# Patient Record
Sex: FEMALE | Race: WHITE | HISPANIC OR LATINO | Employment: OTHER | URBAN - METROPOLITAN AREA
[De-identification: names, ages, dates, MRNs, and addresses within clinical notes are randomized per-mention and may not be internally consistent; named-entity substitution may affect disease eponyms.]

---

## 2017-08-09 ENCOUNTER — HOSPITAL ENCOUNTER (OUTPATIENT)
Dept: RADIOLOGY | Facility: HOSPITAL | Age: 67
Discharge: HOME | End: 2017-08-09
Attending: SURGERY
Payer: COMMERCIAL

## 2017-08-09 DIAGNOSIS — N63.0 LUMP OR MASS IN BREAST: ICD-10-CM

## 2017-08-09 DIAGNOSIS — R92.1 BREAST CALCIFICATIONS: Primary | ICD-10-CM

## 2017-08-09 PROCEDURE — 88305 TISSUE EXAM BY PATHOLOGIST: CPT | Mod: 26,,, | Performed by: PATHOLOGY

## 2017-08-09 PROCEDURE — 19081 BX BREAST 1ST LESION STRTCTC: CPT | Mod: ,,, | Performed by: RADIOLOGY

## 2017-08-09 PROCEDURE — 88305 TISSUE EXAM BY PATHOLOGIST: CPT | Performed by: PATHOLOGY

## 2017-08-09 PROCEDURE — 77065 DX MAMMO INCL CAD UNI: CPT | Mod: TC,LT

## 2017-08-09 PROCEDURE — 77065 DX MAMMO INCL CAD UNI: CPT | Mod: 26,LT,, | Performed by: RADIOLOGY

## 2017-08-09 PROCEDURE — A4648 IMPLANTABLE TISSUE MARKER: HCPCS

## 2017-08-09 NOTE — OP NOTE
[facility]  Radiology  Procedure - Outpatient    Date: 08/09/2017 Time: 11:17 AM    Pre-Op Diagnosis: Abnormal mammogram, suspicious calcifications left breast    Post-Op Diagnosis: same    Procedure Performed by: Fabrizio Snow MD    Assistant: none    Procedure: Stereotactic left breast biopsy    Specimen/Tissue Removed: 6 x 11 gauge cores    Estimated Blood Loss: Less than 5 mL    Procedure Note/Findings: Stereotactic left breast biopsy performed with no immediate post-procedure complications noted.    Please refer to dictated report for additional details.

## 2017-08-09 NOTE — H&P
[facility] [place]  History & Physical - Short Stay  Interventional Radiology    SUBJECTIVE:     Chief Complaint/Reason for Admission: Abnormal mammogram, suspicious calcifications left breast    Informant(s):  self and Electronic Health Record    History of Present Illness:  Richa Alberts is a 66 y.o. female with a history of abnormal mammogram.    Patient presents for stereotactic breast biopsy on left.    Scheduled Meds:   Continuous Infusions:   PRN Meds:     Review of patient's allergies indicates:   Allergen Reactions    Benadryl [diphenhydramine hcl]     Penicillins     Sulfa (sulfonamide antibiotics)        No past medical history on file.  Past Surgical History:   Procedure Laterality Date    CHOLECYSTECTOMY      TUBAL LIGATION      vaginal excisional biopsy       Family History   Problem Relation Age of Onset    Hypertension Father     Diabetes Mother     Hypertension Mother     Diabetes Sister     Stroke Sister      Social History   Substance Use Topics    Smoking status: Never Smoker    Smokeless tobacco: Not on file    Alcohol use No        Review of Systems:  ROS not obtained    OBJECTIVE:     Vital Signs (Most Recent):       Physical Exam:  alert and oriented    Laboratory  CBC: No results found for: WBC, RBC, HGB, HCT, PLT, MCV, MCH, MCHC    ASSESSMENT/PLAN:     Suspicious calcifications left breast.    Patient will undergo stereotactic breast biopsy.    Sedation Plan: 1% lidocaine local anesthetic

## 2017-08-09 NOTE — DISCHARGE SUMMARY
Radiology Discharge Summary      Admit date: 8/9/2017 10:00 AM  Discharge date: August 9, 2017    Instructions Given to patient: YesVerbal    Diet: Regular    Activity:Restriction as listed: No strenuous activities for 48 hours    Medications on discharge (List): Refer to Discharge Medication List    Hospital Course: Stereotactic breast biopsy left breast.    Description of Condition on Discharge: stable    Discharge Disposition: Home    Discharge Diagnosis: Suspicious calcifications on mammography.    Follow up as scheduled.

## 2018-05-22 ENCOUNTER — OFFICE VISIT (OUTPATIENT)
Dept: PODIATRY | Facility: CLINIC | Age: 68
End: 2018-05-22
Payer: COMMERCIAL

## 2018-05-22 VITALS
DIASTOLIC BLOOD PRESSURE: 92 MMHG | WEIGHT: 159 LBS | HEIGHT: 63 IN | SYSTOLIC BLOOD PRESSURE: 152 MMHG | BODY MASS INDEX: 28.17 KG/M2

## 2018-05-22 PROCEDURE — 99999 PR PBB SHADOW E&M-EST. PATIENT-LVL III: CPT | Mod: PBBFAC,,, | Performed by: PODIATRIST

## 2018-05-22 PROCEDURE — 99203 OFFICE O/P NEW LOW 30 MIN: CPT | Mod: S$GLB,,, | Performed by: PODIATRIST

## 2018-05-22 PROCEDURE — 3008F BODY MASS INDEX DOCD: CPT | Mod: CPTII,S$GLB,, | Performed by: PODIATRIST

## 2018-05-22 RX ORDER — TRAMADOL HYDROCHLORIDE 50 MG/1
TABLET ORAL
COMMUNITY

## 2018-05-22 RX ORDER — ACYCLOVIR 800 MG/1
TABLET ORAL
COMMUNITY

## 2018-05-22 RX ORDER — LOSARTAN POTASSIUM 25 MG/1
TABLET ORAL
COMMUNITY

## 2018-05-22 NOTE — PATIENT INSTRUCTIONS
Athletes Foot     Athletes Foot is caused by a fungal infection in the skin. It affects the skin between the toes where it causes fissures (cracks in the skin). It can also affect the bottom of the foot where it causes dry white scales and peeling of the skin. This infection is more likely to occur when the foot is in hot, sweaty socks and shoes for long periods of time.   This infection is treated with skin creams or oral medicine.     Home Care:   It is important to keep the feet dry. Use absorbent cotton socks and change them if they become sweaty; or wear an open-toe shoe or sandal. Wash the feet at least once a day with soap and water.   Rotate your shoes. If you must wear the same shoes everyday then spray the shoes with lysol or antifungal spray and allow that to dry overnight before wearing the shoes again  Apply the antifungal cream as prescribed. Some antifungal creams are available without a prescription (Lotrimin, Tinactin).   It may take a week before the rash starts to improve and it can take about three to four weeks to completely clear. Continue the medicine until the rash is all gone.   Use over-the-counter antifungal powders or sprays on your feet after exposure to high-risk environments (public showers, gyms and locker rooms) may prevent future infections. You may wish to use appropriate footwear to reduce exposure.  Clean tubs and bathroom floor with bleach  Clean feet with Nizoral shampoo or dial antibacterial soap and then dry completely.    Follow Up   with your doctor as recommended by our staff if the rash is not starting to improve after TEN days of treatment, or if the rash continues to spread.     Get Prompt Medical Attention   if any of the following occur:   Increasing redness or swelling of the foot   Pus draining from cracks in the skin   Fever of 100.4ºF (38ºC) or higher, or as directed by your healthcare provider    © 2961-1960 [employer], [address] [place] [address],  [place] [address]. All rights reserved. This information is not intended as a substitute for professional medical care. Always follow your healthcare professional's instructions.             Recommend lotions: eucerin, eucerin for diabetics, aquaphor, A&D ointment, gold bond for diabetics, sween, Stephon's Bees all purpose baby ointment,  urea 40 with aloe (found on amazon.com)    Shoe recommendations: (try 6pm.com, zappos.Veritext , nordstromrack.Veritext, or shoes.Veritext for discounted prices) you can visit [employer]SW shoes in [place]  or [employer] in the [place] (there are also several shoe brand outlets in the [place])    Asics (GT 2000 or gel foundations), new balance stability type shoes, saucony (stabil c3),  Corrales (GTS or Beast or transcend), vionic, propet (tennis shoe)    sofft brand, clarks, crocs, aerosoles, naturalizers, SAS, ecco, born, shari otero, rockports (dress shoes)    Vionic, burkenstocks, fitflops, propet (sandals)  Nike comfort thong sandals, crocs, propet (house shoes)    Nail Home remedy:  Vicks Vapor rub to nails for easier managability    Occasional soaks for 15-20 mins in luke warm water with 1 cup of listerine and 1 cup of apple cider vinegar are ok You may add several drops of oil of oregano or tea tree oil as well      Wearing Proper Shoes                    You walk on your feet every day, forcing them to support the weight of your body. Repeated stress on your feet can cause damage over time. The right shoes can help protect your feet. The wrong shoes can cause more foot problems. Read the information below to help you find a shoe that fits your foot needs.      A good shoe fit will cover your foot outline. A shoe that doesnt cover the outline is a bad fit.   Whats your foot shape?  To get a good fit, you need to know the shape of your foot. Do this simple test: While standing, place your foot on a piece of paper and trace around it. Is your foot straight or curved? Do you have a foot problem, such as a  bunion, that causes your foot outline to show a bulge on the side of your big toe?  Finding your fit  Bring your foot outline to the shoe store to help you find the right shoe. Place a shoe you like on top of the outline to see if it matches the shape. The shoe should cover the outline. (If you have a bunion, the shoe may not cover the bulge on the outline. Look for soft leather shoes to stretch over the bunion.) Once youve found a pair of proper shoes, put them on. Walk around. Be sure the shoes dont rub or pinch. If the shoes feel good, youve found your fit!  The right shoe for you  A good shoe has features that provide comfort and support. It must also be the right size and shape for your feet. Look for a shoe made of breathable fabric and lining, such as leather or canvas. Be sure that shoes have enough tread to prevent slipping. Go to a good shoe store for help finding the right shoe.  Good shoe features  An ideal shoe has the following:  · Laces for support. If tying laces is a problem for you, try shoes with Velcro fasteners or blessing.  · A front of the shoe (toe box) with ½ inch space in front of your longest toes.  · An arch shape that supports your foot.  · No more than 1½ inches of heel.  · A stiff, snug back of the shoe to keep your foot from sliding around.  · A smooth lining with no rough seams.  Shoe shopping tips  Below are some dos and donts for when you go to the shoe store.  Do:  · Select the shoes that feel right. Wear them around the house. Then bring them to your foot healthcare provider to check for fit. If they dont fit well, return them.  · Shop late in the day, when your feet will be slightly bigger.  · Each time you buy shoes, have both your feet measured while you are standing. Foot size changes with time.  · Pick shoes to suit their purpose. High heels are OK for an occasional night on the town. But for everyday wear, choose a more sensible shoe.  · Try on shoes while wearing any  inserts specially made for your feet (orthoses).  · Try on both the right and left shoes. If your feet are different sizes, pick a pair that fits the larger foot.  Dont:  · Dont buy shoes based on shoe size alone. Always try on shoes, as sizes differ from brand to brand and within brands.  · Dont expect shoes to break in. If they dont fit at the store, dont buy them.  · Dont buy a shoe that doesnt match your foot shape.  What about socks?  Always wear socks with shoes. Socks help absorb sweat and reduce friction and blistering. When shopping for shoes, choose soft, padded socks with seams that dont irritate your feet.  If you have foot problems  Some foot problems cause deformities. This can make it hard to find a good fit. Look for shoes made of soft leather to stretch over the deformity. If you have bunions, buy shoes with a wider toe box. To fit hammertoes, look for shoes with a tall toe box. If you have arch problems, you may need inserts. In some cases, youll need to have custom footwear or orthoses made for your feet.  Suggested footwear  Ask your healthcare provider what kind of footwear you need. He or she may recommend a certain brand or shoe store.  Date Last Reviewed: 8/1/2016  © 3237-9309 [employer]. [address] [place] [address]. All rights reserved. This information is not intended as a substitute for professional medical care. Always follow your healthcare professional's instructions.        Step-by-Step:  Inspecting Your Feet   Date Last Reviewed: 10/1/2016  © 4714-8086 [employer]. [address] [place] [address]. All rights reserved. This information is not intended as a substitute for professional medical care. Always follow your healthcare professional's instructions.

## 2018-05-24 NOTE — PROGRESS NOTES
Subjective:      Patient ID: Richa Alberts is a 67 y.o. female.    Chief Complaint: Nail Problem (right foot great toe)    Richa Alberts is a 67 y.o. female who presents to the clinic complaining of loss of her right hallux toenail following a fall one week ago. Patient has attempted self treatment with removal of 4/5ths of the nail on her own.  This is a initial problem. Patient admits to history of trauma. Patient  denies purulent drainage.    Current shoe gear:  Slip-on shoes    Patient Active Problem List   Diagnosis    Autoimmune hepatitis       Current Outpatient Prescriptions on File Prior to Visit   Medication Sig Dispense Refill    amlodipine (NORVASC) 10 MG tablet Take 10 mg by mouth once daily.      diazepam (VALIUM) 5 MG tablet       estradiol (ESTRACE) 1 MG tablet TAKE 1 TABLET(1 MG) BY MOUTH EVERY DAY 30 tablet 0    medroxyPROGESTERone (PROVERA) 2.5 MG tablet TAKE 1 TABLET(2.5 MG) BY MOUTH EVERY DAY 30 tablet 0     No current facility-administered medications on file prior to visit.        Review of patient's allergies indicates:   Allergen Reactions    Benadryl [diphenhydramine hcl]     Penicillins     Sulfa (sulfonamide antibiotics)        Past Surgical History:   Procedure Laterality Date    CHOLECYSTECTOMY      TUBAL LIGATION      vaginal excisional biopsy         Family History   Problem Relation Age of Onset    Hypertension Father     Diabetes Mother     Hypertension Mother     Diabetes Sister     Stroke Sister        Social History     Social History    Marital status: Single     Spouse name: N/A    Number of children: N/A    Years of education: N/A     Occupational History    Not on file.     Social History Main Topics    Smoking status: Never Smoker    Smokeless tobacco: Not on file    Alcohol use No    Drug use: No    Sexual activity: No     Other Topics Concern    Not on file     Social History Narrative    No narrative on file       Review of Systems  "  Constitution: Negative for chills, fever and weakness.   Cardiovascular: Negative for claudication and leg swelling.   Respiratory: Negative for cough and shortness of breath.    Skin: Positive for dry skin and nail changes. Negative for itching and rash.   Musculoskeletal: Positive for joint pain and myalgias. Negative for falls, joint swelling and muscle weakness.   Gastrointestinal: Negative for diarrhea, nausea and vomiting.   Neurological: Negative for numbness, paresthesias and tremors.   Psychiatric/Behavioral: Negative for altered mental status and hallucinations.           Objective:      Vitals:    05/22/18 1606   BP: (!) 152/92   Weight: 72.1 kg (159 lb)   Height: 5' 3" (1.6 m)   PainSc: 0-No pain       Physical Exam   Constitutional: She appears well-developed.  Non-toxic appearance. She does not have a sickly appearance. No distress.   Cardiovascular:   Pulses:       Dorsalis pedis pulses are 2+ on the right side, and 2+ on the left side.        Posterior tibial pulses are 2+ on the right side, and 2+ on the left side.   dorsalis pedis and posterior tibial pulses are palpable bilaterally. Capillary refill time is within normal limits.   Pulmonary/Chest: No respiratory distress.   Musculoskeletal:        Right ankle: She exhibits normal range of motion and no swelling. No tenderness. No lateral malleolus, no medial malleolus, no AITFL, no CF ligament and no posterior TFL tenderness found. Achilles tendon exhibits no pain, no defect and normal Aviles's test results.        Left ankle: She exhibits normal range of motion and no swelling. No tenderness. No lateral malleolus, no medial malleolus, no AITFL, no CF ligament and no posterior TFL tenderness found. Achilles tendon exhibits no pain, no defect and normal Aviles's test results.        Right foot: There is no tenderness and no bony tenderness.        Left foot: There is no tenderness and no bony tenderness.   Neurological: She has normal " reflexes. She displays no atrophy and no tremor. No sensory deficit. She exhibits normal muscle tone.   Solo-Fabiana 5.07 monofilament is intact bilateral feet. Sharp/dull sensation is also intact Bilateral feet.     Skin: Skin is warm, dry and intact. No bruising, no burn, no laceration, no lesion and no rash noted. She is not diaphoretic. No cyanosis. No pallor. Nails show no clubbing.   onycholysis of 4/5ths of the right hallux nail as pictured.  No active drainage. No fluctuance, no purulence, no laceration to nail bed.       Psychiatric: Her mood appears not anxious. Her affect is not inappropriate. Her speech is not slurred. She is not combative. She is communicative. She is attentive.   Nursing note reviewed.                Assessment:       Encounter Diagnosis   Name Primary?    Traumatic loss of toenail, right, initial encounter Yes         Plan:       Richa was seen today for nail problem.    Diagnoses and all orders for this visit:    Traumatic loss of toenail, right, initial encounter      I counseled the patient on her conditions, their implications and medical management.    Discussed treatment options with patient. Options included soaking, avulsion and matrixectomy. Risks and benefits discussed and all questions were answered.     Informed patient that many nail problems can be prevented by wearing the right shoes and trimming your nails properly.   The right shoes: Feet were measured.  Patient is to wear shoes that are supportive and roomy enough for toes to wiggle. Look for shoes made of natural materials such as leather, which allow  feet to breathe.   Proper trimming: To avoid problems, she was instructed to trim toenails straight across without cutting down into the corners.      RTC PRN or if patient wishes to pursue ingrown nail procedure        Procedures

## 2018-12-02 ENCOUNTER — OFFICE VISIT (OUTPATIENT)
Dept: URGENT CARE | Facility: CLINIC | Age: 68
End: 2018-12-02
Payer: COMMERCIAL

## 2018-12-02 VITALS
BODY MASS INDEX: 28 KG/M2 | SYSTOLIC BLOOD PRESSURE: 157 MMHG | RESPIRATION RATE: 20 BRPM | TEMPERATURE: 98 F | OXYGEN SATURATION: 96 % | HEART RATE: 72 BPM | WEIGHT: 158 LBS | HEIGHT: 63 IN | DIASTOLIC BLOOD PRESSURE: 77 MMHG

## 2018-12-02 DIAGNOSIS — S90.852A FOREIGN BODY IN LEFT FOOT, INITIAL ENCOUNTER: Primary | ICD-10-CM

## 2018-12-02 PROCEDURE — 1101F PT FALLS ASSESS-DOCD LE1/YR: CPT | Mod: CPTII,S$GLB,, | Performed by: NURSE PRACTITIONER

## 2018-12-02 PROCEDURE — 99203 OFFICE O/P NEW LOW 30 MIN: CPT | Mod: S$GLB,,, | Performed by: NURSE PRACTITIONER

## 2018-12-02 RX ORDER — MUPIROCIN 20 MG/G
OINTMENT TOPICAL 3 TIMES DAILY
Qty: 22 G | Refills: 0 | OUTPATIENT
Start: 2018-12-02 | End: 2020-08-18

## 2018-12-02 NOTE — PROGRESS NOTES
"Subjective:       Patient ID: Richa Alberts is a 68 y.o. female.    Vitals:  height is 5' 3" (1.6 m) and weight is 71.7 kg (158 lb). Her temperature is 97.5 °F (36.4 °C). Her blood pressure is 157/77 (abnormal) and her pulse is 72. Her respiration is 20 and oxygen saturation is 96%.     Chief Complaint: Foot Pain    Pt stepped on something yesterday and thinks its glass in her foot. She states it feels like something is picking at her in her heel.      Foot Pain   This is a new problem. The current episode started yesterday. The problem occurs constantly. The problem has been unchanged. Nothing aggravates the symptoms. She has tried nothing for the symptoms.       Skin: Positive for erythema.       Objective:      Physical Exam   Constitutional: She is oriented to person, place, and time. She appears well-developed and well-nourished.   HENT:   Head: Normocephalic and atraumatic. Head is without abrasion, without contusion and without laceration.   Right Ear: External ear normal.   Left Ear: External ear normal.   Nose: Nose normal.   Mouth/Throat: Oropharynx is clear and moist.   Eyes: Conjunctivae, EOM and lids are normal. Pupils are equal, round, and reactive to light.   Neck: Trachea normal, full passive range of motion without pain and phonation normal. Neck supple.   Cardiovascular: Normal rate, regular rhythm and normal heart sounds.   Pulmonary/Chest: Effort normal and breath sounds normal. No stridor. No respiratory distress.   Musculoskeletal: Normal range of motion.   Neurological: She is alert and oriented to person, place, and time.   Skin: Skin is warm, dry and intact. Capillary refill takes less than 2 seconds. No abrasion, no bruising, no burn, no ecchymosis, no laceration, no lesion and no rash noted. There is erythema.   Left heel: superficial foreign body suspected d/t induration   Psychiatric: She has a normal mood and affect. Her speech is normal and behavior is normal. Judgment and thought " content normal. Cognition and memory are normal.   Nursing note and vitals reviewed.      Assessment:       1. Foreign body in right foot, initial encounter        Plan:         Foreign body in right foot, initial encounter  -     mupirocin (BACTROBAN) 2 % ointment; Apply topically 3 (three) times daily.  Dispense: 22 g; Refill: 0      Patient Instructions       USE BACTROBAN ON FOOT THREE TIMES A DAY FOR THREE DAYS    FOLLOW UP IF SIGNS OF INFECTION ARE PRESENT- REDNESS, SWELLING, DRAINAGE    Foreign Object Under the Skin (Removed)  An object has been removed from under your skin. Although care was taken to remove all of it, there is always a chance that a small piece may have been left behind.  Most skin wounds heal without problems. But there can be an increased risk of infection if anything stays under the skin. Sometimes the pieces work their way out on their own, and sometimes they can cause an infection. Very small pieces that stay under the skin usually dont cause a problem or need further treatment.  Home care  Wound care  · Keep the wound clean and dry.  · If there is a dressing or bandage, change it when it gets wet or dirty. Otherwise, leave it on for the first 24 hours, then change it once a day or as often as you were instructed.  · If stitches or staples were used, clean the wound every day:  ¨ After taking off the dressing, wash the area gently with soap and water.  ¨ Apply a thin layer of antibiotic ointment to the cut. This will keep the wound clean and make it easier to remove the stitches. If it is oozing a lot, you can put a nonstick dressing over it. Then reapply the bandage or dressing as you were instructed.  ¨ You can get it wet, just like when you clean it. This means you can shower as usual for the first 24 hours, but do not soak the area in water (no baths or swimming) until the stitches or staples are taken out.  · If surgical tape or strips were used, keep the area clean and dry. If it  becomes wet, blot it dry with a towel.    Medicine  · You can take over-the-counter medicine for pain, unless you were given a different pain medicine to use. If you have chronic liver or kidney disease or ever had a stomach ulcer, or gastrointestinal bleeding or are taking blood thinner medicines, talk with your healthcare provider before using these medicines.  · If you were given antibiotics, take them until they are used up. It is important to finish the antibiotics even if the wound looks better to make sure the infection clears.  Follow-up care  Follow up your healthcare provider, or as advised. Keep in mind the following:  · Watch for any signs of infection, such as increasing pain, redness, swelling, or pus drainage. If this happens, dont wait for your scheduled visit, rather see your healthcare provider sooner.  · Stitches or staples are usually taken out within 5 to 14 days. This varies depending on what part of your body they are on, and the type of wound. The healthcare provider will tell you how long they should be left in.  · If surgical tape or strips were used, they are usually left on for 7 to 10 days. You can remove them after that unless you were told otherwise. If you try to remove them, and it is too difficult, soaking can help. If the edges of the cut pull apart, then stop removing the tape, and follow up with your healthcare provider.  · If X-rays were taken, you will be told if there are new findings that may affect your care.  When to seek medical care  Call your healthcare provider right away if any of these occur:  · Fever of 100.4ºF (38ºC) or higher, or as directed by your healthcare provider  · Increasing pain in the wound  · Redness, swelling or pus coming from the wound  Date Last Reviewed: 10/1/2016  © 3898-0808 [employer]. [address] [place] [address], [place] [address]. All rights reserved. This information is not intended as a substitute for professional medical care.  Always follow your healthcare professional's instructions.

## 2018-12-02 NOTE — PROCEDURES
Procedures   Left heel cleaned with alcohol wipe and betadine and draped with sterile towel. 2ml of 1% lidocaine injected in heel and 11 in blade used to create 0.2 cm opening in left heel. Small piece of glass removed with tweezers and purulent drainage expressed manually. Wound was dressed with Bactroban ointment and bandaid.

## 2018-12-02 NOTE — PATIENT INSTRUCTIONS
USE BACTROBAN ON FOOT THREE TIMES A DAY FOR THREE DAYS    FOLLOW UP IF SIGNS OF INFECTION ARE PRESENT- REDNESS, SWELLING, DRAINAGE    Foreign Object Under the Skin (Removed)  An object has been removed from under your skin. Although care was taken to remove all of it, there is always a chance that a small piece may have been left behind.  Most skin wounds heal without problems. But there can be an increased risk of infection if anything stays under the skin. Sometimes the pieces work their way out on their own, and sometimes they can cause an infection. Very small pieces that stay under the skin usually dont cause a problem or need further treatment.  Home care  Wound care  · Keep the wound clean and dry.  · If there is a dressing or bandage, change it when it gets wet or dirty. Otherwise, leave it on for the first 24 hours, then change it once a day or as often as you were instructed.  · If stitches or staples were used, clean the wound every day:  ¨ After taking off the dressing, wash the area gently with soap and water.  ¨ Apply a thin layer of antibiotic ointment to the cut. This will keep the wound clean and make it easier to remove the stitches. If it is oozing a lot, you can put a nonstick dressing over it. Then reapply the bandage or dressing as you were instructed.  ¨ You can get it wet, just like when you clean it. This means you can shower as usual for the first 24 hours, but do not soak the area in water (no baths or swimming) until the stitches or staples are taken out.  · If surgical tape or strips were used, keep the area clean and dry. If it becomes wet, blot it dry with a towel.    Medicine  · You can take over-the-counter medicine for pain, unless you were given a different pain medicine to use. If you have chronic liver or kidney disease or ever had a stomach ulcer, or gastrointestinal bleeding or are taking blood thinner medicines, talk with your healthcare provider before using these  medicines.  · If you were given antibiotics, take them until they are used up. It is important to finish the antibiotics even if the wound looks better to make sure the infection clears.  Follow-up care  Follow up your healthcare provider, or as advised. Keep in mind the following:  · Watch for any signs of infection, such as increasing pain, redness, swelling, or pus drainage. If this happens, dont wait for your scheduled visit, rather see your healthcare provider sooner.  · Stitches or staples are usually taken out within 5 to 14 days. This varies depending on what part of your body they are on, and the type of wound. The healthcare provider will tell you how long they should be left in.  · If surgical tape or strips were used, they are usually left on for 7 to 10 days. You can remove them after that unless you were told otherwise. If you try to remove them, and it is too difficult, soaking can help. If the edges of the cut pull apart, then stop removing the tape, and follow up with your healthcare provider.  · If X-rays were taken, you will be told if there are new findings that may affect your care.  When to seek medical care  Call your healthcare provider right away if any of these occur:  · Fever of 100.4ºF (38ºC) or higher, or as directed by your healthcare provider  · Increasing pain in the wound  · Redness, swelling or pus coming from the wound  Date Last Reviewed: 10/1/2016  © 2442-7123 [employer]. [address] [place] [address], [place] [address]. All rights reserved. This information is not intended as a substitute for professional medical care. Always follow your healthcare professional's instructions.

## 2020-08-18 ENCOUNTER — HOSPITAL ENCOUNTER (EMERGENCY)
Facility: HOSPITAL | Age: 70
Discharge: HOME | End: 2020-08-18
Attending: INTERNAL MEDICINE
Payer: COMMERCIAL

## 2020-08-18 VITALS
OXYGEN SATURATION: 98 % | WEIGHT: 153 LBS | HEIGHT: 63 IN | DIASTOLIC BLOOD PRESSURE: 64 MMHG | SYSTOLIC BLOOD PRESSURE: 169 MMHG | BODY MASS INDEX: 27.11 KG/M2 | TEMPERATURE: 98 F | RESPIRATION RATE: 20 BRPM | HEART RATE: 90 BPM

## 2020-08-18 DIAGNOSIS — S09.90XA INJURY OF HEAD, INITIAL ENCOUNTER: Primary | ICD-10-CM

## 2020-08-18 DIAGNOSIS — R93.7 ABNORMAL CT SCAN, CERVICAL SPINE: ICD-10-CM

## 2020-08-18 DIAGNOSIS — T14.8XXA HEMATOMA: ICD-10-CM

## 2020-08-18 PROCEDURE — 99285 EMERGENCY DEPT VISIT HI MDM: CPT | Mod: 25,ER

## 2020-08-18 PROCEDURE — 25000003 PHARM REV CODE 250: Mod: ER | Performed by: PHYSICIAN ASSISTANT

## 2020-08-18 RX ORDER — LIDOCAINE 50 MG/G
1 PATCH TOPICAL DAILY
Qty: 15 PATCH | Refills: 0 | Status: SHIPPED | OUTPATIENT
Start: 2020-08-18 | End: 2020-09-02

## 2020-08-18 RX ORDER — IBUPROFEN 600 MG/1
600 TABLET ORAL EVERY 6 HOURS PRN
Qty: 20 TABLET | Refills: 0 | Status: SHIPPED | OUTPATIENT
Start: 2020-08-18 | End: 2020-08-23

## 2020-08-18 RX ORDER — MUPIROCIN 20 MG/G
OINTMENT TOPICAL 3 TIMES DAILY
Qty: 15 G | Refills: 0 | Status: SHIPPED | OUTPATIENT
Start: 2020-08-18 | End: 2020-08-25

## 2020-08-18 RX ORDER — METHOCARBAMOL 500 MG/1
1000 TABLET, FILM COATED ORAL 3 TIMES DAILY PRN
Qty: 30 TABLET | Refills: 0 | Status: SHIPPED | OUTPATIENT
Start: 2020-08-18 | End: 2020-08-23

## 2020-08-18 RX ORDER — METHOCARBAMOL 500 MG/1
1000 TABLET, FILM COATED ORAL
Status: DISCONTINUED | OUTPATIENT
Start: 2020-08-18 | End: 2020-08-18

## 2020-08-18 RX ORDER — ACETAMINOPHEN 500 MG
500 TABLET ORAL EVERY 4 HOURS PRN
Qty: 20 TABLET | Refills: 0 | Status: SHIPPED | OUTPATIENT
Start: 2020-08-18 | End: 2020-08-23

## 2020-08-18 RX ORDER — MUPIROCIN 20 MG/G
OINTMENT TOPICAL
Status: COMPLETED | OUTPATIENT
Start: 2020-08-18 | End: 2020-08-18

## 2020-08-18 RX ORDER — IBUPROFEN 600 MG/1
600 TABLET ORAL
Status: COMPLETED | OUTPATIENT
Start: 2020-08-18 | End: 2020-08-18

## 2020-08-18 RX ADMIN — IBUPROFEN 600 MG: 600 TABLET ORAL at 02:08

## 2020-08-18 RX ADMIN — MUPIROCIN: 20 OINTMENT TOPICAL at 02:08

## 2020-08-18 NOTE — ED NOTES
Pt reports slip and fall today landing backwards and hitting back of [place]a [place] ground with small lac. Unknown LOC.

## 2020-08-18 NOTE — DISCHARGE INSTRUCTIONS
Take Ibuprofen and tylenol for pain, Robaxin for muscle pain. Lidoderm for muscle pain. Bactroban to wound on scalp to prevent infection. Follow up with primary care in 2 days. Return to ER for worsening symptoms or as needed.

## 2020-08-18 NOTE — ED PROVIDER NOTES
"Encounter Date: 8/18/2020       History     Chief Complaint   Patient presents with    Fall     slip and fall, just occurred, lac to back of head, unknown LOC, reports "possibly a few min"     CC: Fall    This patient is 70 year old female with PMHx of HTN and Anxiety who reports falling and hitting her head approximately 30 minutes ago. The patient reports that she slipped and fell when she was headed to the bathroom. She reports falling and hitting the posterior portion of her skull. She states that she did not lose consciousness, but reports some short term confusion that quickly resolved. She also describes noticing a sizeable amount of blood on the floor. She reports taking a shower and then going to the urgent care where she was told to come to the ER. She denies use of blood thinners, LOC, current confusion, hearing or vision changes, loss of balance, and denies any other areas of pain on her body. She also denies any N/V/D, gait abnormalities, urinary or bowel abnormalities, and reports a headache (5/10). She reports that she is compliant with all prescribed medications and took them all this morning prior to the fall. She also reports taking two tylenol prior to the fall for joint pain.     All information was gather from the patient. The patient is AAOx3.         Review of patient's allergies indicates:   Allergen Reactions    Benadryl [diphenhydramine hcl]     Penicillins     Sulfa (sulfonamide antibiotics)      Past Medical History:   Diagnosis Date    Hypertension      Past Surgical History:   Procedure Laterality Date    CHOLECYSTECTOMY      TUBAL LIGATION      vaginal excisional biopsy       Family History   Problem Relation Age of Onset    Hypertension Father     Diabetes Mother     Hypertension Mother     Diabetes Sister     Stroke Sister      Social History     Tobacco Use    Smoking status: Never Smoker   Substance Use Topics    Alcohol use: No    Drug use: No     Review of Systems "   Constitutional: Negative for chills and fever.   HENT: Negative for drooling, ear discharge, ear pain, facial swelling, hearing loss, nosebleeds, rhinorrhea, sinus pain, sore throat and trouble swallowing.    Respiratory: Negative for chest tightness and shortness of breath.    Cardiovascular: Negative for chest pain, palpitations and leg swelling.   Gastrointestinal: Negative for abdominal pain, blood in stool, diarrhea, nausea, rectal pain and vomiting.   Genitourinary: Negative for difficulty urinating, dysuria, hematuria and pelvic pain.   Musculoskeletal: Negative for back pain, gait problem, neck pain and neck stiffness.   Skin: Negative for rash.   Neurological: Positive for headaches. Negative for dizziness, seizures, syncope, speech difficulty and weakness.   Hematological: Does not bruise/bleed easily.   Psychiatric/Behavioral: Positive for confusion.       Physical Exam     Initial Vitals [08/18/20 1333]   BP Pulse Resp Temp SpO2   (!) 169/64 90 20 97.9 °F (36.6 °C) 98 %      MAP       --         Physical Exam    Nursing note and vitals reviewed.  Constitutional: She appears well-developed and well-nourished. No distress.   HENT:   Head: Normocephalic. Head is with abrasion. Head is without raccoon's eyes, without Castano's sign, without laceration, without right periorbital erythema and without left periorbital erythema.       Right Ear: Hearing, tympanic membrane, external ear and ear canal normal.   Left Ear: Hearing, tympanic membrane, external ear and ear canal normal.   Nose: Nose normal. No mucosal edema, rhinorrhea, nose lacerations or sinus tenderness. No epistaxis. Right sinus exhibits no maxillary sinus tenderness and no frontal sinus tenderness. Left sinus exhibits no maxillary sinus tenderness and no frontal sinus tenderness.   Mouth/Throat: Uvula is midline, oropharynx is clear and moist and mucous membranes are normal. No oral lesions. No uvula swelling.   Abrasion noted to the midline  posterior scalp, mild tenderness noted to palpation of the area. There is no active bleeding noted.    Eyes: Conjunctivae and EOM are normal. Pupils are equal, round, and reactive to light.   Neck: Normal range of motion. Neck supple. No spinous process tenderness and no muscular tenderness present. No tracheal deviation present. No JVD present.   Cardiovascular: Normal rate, regular rhythm, normal heart sounds and intact distal pulses.   Pulmonary/Chest: Breath sounds normal. No respiratory distress. She exhibits no tenderness.   Abdominal: Bowel sounds are normal. There is no abdominal tenderness. There is no rebound and no guarding.   Musculoskeletal: Normal range of motion.      Right shoulder: Normal.      Left shoulder: Normal.      Right elbow: Normal.     Left elbow: Normal.      Right wrist: Normal.      Left wrist: Normal.      Right knee: Normal.      Left knee: Normal.        Right ankle: Normal.        Left ankle: Normal.      Cervical back: Normal.      Thoracic back: Normal.      Lumbar back: Normal.      Right lower leg: Normal. She exhibits no tenderness and no swelling.      Left lower leg: Normal. She exhibits no tenderness and no swelling.   Lymphadenopathy:     She has no cervical adenopathy.   Neurological: She is alert and oriented to person, place, and time. She has normal strength and normal reflexes. She is not disoriented. She displays normal reflexes. No cranial nerve deficit or sensory deficit. Coordination and gait normal. GCS score is 15. GCS eye subscore is 4. GCS verbal subscore is 5. GCS motor subscore is 6. She displays no Babinski's sign on the right side. She displays no Babinski's sign on the left side.   Skin: Skin is warm. Capillary refill takes less than 2 seconds.   Psychiatric: She has a normal mood and affect. Her behavior is normal. Judgment and thought content normal.         ED Course   Procedures  Labs Reviewed - No data to display       Imaging Results          CT  Head Without Contrast (Final result)  Result time 08/18/20 14:18:43    Final result by Andry Concepcion DO (08/18/20 14:18:43)                 Impression:      Small focal induration midline superior occipital soft tissues while nonspecific in light of history may represent subcutaneous hematoma without gross underlying calvarial fracture.    Otherwise unremarkable noncontrast CT head specifically without evidence for acute intracranial hemorrhage.  Clinical correlation and further evaluation as warranted.      Electronically signed by: Andry Concepcion DO  Date:    08/18/2020  Time:    14:18             Narrative:    EXAMINATION:  CT HEAD WITHOUT CONTRAST    CLINICAL HISTORY:  Head trauma, minor (Age => 67y);    TECHNIQUE:  Multiple sequential 5 mm axial images of the head without contrast.  Coronal and sagittal reformatted imaging from the axial acquisition.    COMPARISON:  None    FINDINGS:  There is no evidence for acute intracranial hemorrhage or sulcal effacement.  The ventricles are normal in size without hydrocephalus.  There is no midline shift or mass effect.  Few patchy ethmoid air cell opacities, remaining paranasal sinuses and mastoid air cells are clear.  There is a small focus of induration in the midline in occipital soft tissues superiorly without gross underlying calvarial fracture.  This may represent small subcutaneous hematoma clinical correlation advised.                                CT Cervical Spine Without Contrast (Final result)  Result time 08/18/20 14:24:54    Final result by Andry Concepcion DO (08/18/20 14:24:54)                 Impression:      Spondylo degenerative changes cervical spine without evidence for acute fracture.  Allowing for CT technique degenerative change most pronounced at C4/C5 with posterior disc osteophyte, uncovertebral joint hypertrophy and facet joint arthropathy with mild central canal stenosis and moderate right and mild left neural foraminal  stenosis    Subcentimeter lucent lesion within the dont oil and less well-defined focus in the C3 spinous process which are indeterminate.  If history of primary neoplasm lead metastases to be included in differential.  Clinical correlation and follow-up evaluation with MRI if patient compatible.      Electronically signed by: Andry Concepcion DO  Date:    08/18/2020  Time:    14:24             Narrative:    EXAMINATION:  CT CERVICAL SPINE WITHOUT CONTRAST    CLINICAL HISTORY:  Neck trauma (Age => 67y);    TECHNIQUE:  Low dose axial images, sagittal and coronal reformations were performed though the cervical spine.  Contrast was not administered.    COMPARISON:  None    FINDINGS:  There is trace grade 1 anterolisthesis of C3 on C4.  There is degenerative disc disease with intervertebral disc height loss and endplate degenerative change at all levels.  There is small focal hypodensity within the odontoid posteriorly in the mid aspect which is nonspecific the if history of underlying neoplasm lytic metastases cannot be excluded.  Clinical correlation and consideration for follow-up MRI.  In addition there is suggestion of additional lucent lesion within the C3 spinous process.    Allowing for degenerative changes the cervical vertebral body heights and contours are within normal is without evidence for acute fracture.  There is a small inferior endplate C3 vertebral body Schmorl node deformity with vacuum phenomena.    Allowing for degenerative changes prevertebral soft tissues within normal limits.    Please note evaluation of the central canal neural foramen is limited by CT technique allowing for limitation degenerative change as follows:    C2/C3: Small disc bulge with uncovertebral joint hypertrophy and facet joint arthropathy without significant central canal or neural foraminal stenosis.    C3/C4: Posterior disc osteophyte with uncovertebral joint hypertrophy and facet joint arthropathy with mild central canal  stenosis and moderate bilateral neural foraminal stenosis.    C4/C5: Posterior disc osteophyte complex with uncovertebral joint hypertrophy and facet joint arthropathy mild central canal stenosis with moderate right and mild left neural foraminal stenosis.    C5/C6: Posterior disc osteophyte with uncovertebral joint hypertrophy and facet joint arthropathy without significant central canal stenosis with mild moderate bilateral neural foraminal stenosis.    C6/C7: Posterior disc osteophyte with uncovertebral joint hypertrophy and facet joint arthropathy without significant central canal stenosis and mild bilateral neural foraminal stenosis.    C7/T1: No significant disc bulge, central canal or neural foraminal stenosis.    This report was flagged in Epic as abnormal.                                 Medical Decision Making:   Initial Assessment:   70 year-old female with no pertinent past medical history presenting for evaluation of head injury that occurred 30 min prior to arrival.  Patient states she slipped on water and fell backwards hitting back of her head.  Denies loss of consciousness.  Reports some mild confusion initially that resolved.  Denies any visual disturbance, nausea, vomiting, neck pain, weakness, paresthesias.  Exam above.  There is hematoma to the occipital scalp.  No laceration to repair.  No focal neurologic deficits.  CT of the head is negative for skull fracture or intracranial bleed.  Remarkable for hematoma.  CT cervical spine is negative for fracture or dislocation.  Remarkable for possible metastatic changes at C3.  Discussed with patient.  She denies any known history of cancer.  Instructed her to follow up with her primary care for MRI and further evaluation management.  She has no midline tenderness of the spine.  Cervical spine neuro intact.  Denies any chest pain, shortness breath, dizziness or lightheadedness prior to the fall.  No other complaints.  Will discharge with medications  for symptomatic treatment.  Will have her follow up with primary care in 2 days for wound check and return to emergency department for worsening symptoms or as needed.                                 Clinical Impression:       ICD-10-CM ICD-9-CM   1. Injury of head, initial encounter  S09.90XA 959.01   2. Hematoma  T14.8XXA 924.9   3. Abnormal CT scan, cervical spine  R93.7 793.7             ED Disposition Condition    Discharge Stable        ED Prescriptions     Medication Sig Dispense Start Date End Date Auth. Provider    ibuprofen (ADVIL,MOTRIN) 600 MG tablet Take 1 tablet (600 mg total) by mouth every 6 (six) hours as needed for Pain. 20 tablet 8/18/2020 8/23/2020 Ingris Torres PA-C    acetaminophen (TYLENOL) 500 MG tablet Take 1 tablet (500 mg total) by mouth every 4 (four) hours as needed. 20 tablet 8/18/2020 8/23/2020 Ingris Torres PA-C    lidocaine (LIDODERM) 5 % Place 1 patch onto the skin once daily. Remove & Discard patch within 12 hours or as directed by MD. May use 4% over the counter if not covered by insurance for 15 days 15 patch 8/18/2020 9/2/2020 Ingris Torres PA-C    mupirocin (BACTROBAN) 2 % ointment Apply topically 3 (three) times daily. for 7 days 15 g 8/18/2020 8/25/2020 Ingris Torres PA-C    methocarbamoL (ROBAXIN) 500 MG Tab Take 2 tablets (1,000 mg total) by mouth 3 (three) times daily as needed (for muscle soreness). MAY CAUSE DROWSINESS 30 tablet 8/18/2020 8/23/2020 Ingris Torres PA-C        Follow-up Information     Follow up With Specialties Details Why Contact Info    Kel Zamora MD General Practice Schedule an appointment as soon as possible for a visit in 2 days for follow up [address] [place] [address] [id number]  [facility] [address] [phone]      [facility] Emergency Department Emergency Medicine Go to  As needed, If symptoms worsen [address] [facility] [id number] [phone]                                      Ingris Torres PA-C  08/18/20 1549

## 2021-03-01 ENCOUNTER — CLINICAL SUPPORT (OUTPATIENT)
Dept: URGENT CARE | Facility: CLINIC | Age: 71
End: 2021-03-01
Payer: COMMERCIAL

## 2021-03-01 DIAGNOSIS — Z20.822 EXPOSURE TO COVID-19 VIRUS: Primary | ICD-10-CM

## 2021-03-01 LAB
CTP QC/QA: YES
SARS-COV-2 RDRP RESP QL NAA+PROBE: NEGATIVE

## 2021-03-01 PROCEDURE — U0002: ICD-10-PCS | Mod: QW,S$GLB,, | Performed by: PHYSICIAN ASSISTANT

## 2021-03-01 PROCEDURE — U0002 COVID-19 LAB TEST NON-CDC: HCPCS | Mod: QW,S$GLB,, | Performed by: PHYSICIAN ASSISTANT

## 2022-06-17 ENCOUNTER — HOSPITAL ENCOUNTER (EMERGENCY)
Facility: HOSPITAL | Age: 72
Discharge: HOME | End: 2022-06-17
Attending: EMERGENCY MEDICINE
Payer: COMMERCIAL

## 2022-06-17 VITALS
DIASTOLIC BLOOD PRESSURE: 82 MMHG | SYSTOLIC BLOOD PRESSURE: 144 MMHG | HEART RATE: 73 BPM | WEIGHT: 150 LBS | OXYGEN SATURATION: 96 % | RESPIRATION RATE: 16 BRPM | BODY MASS INDEX: 26.57 KG/M2 | TEMPERATURE: 98 F

## 2022-06-17 DIAGNOSIS — S09.90XA CHI (CLOSED HEAD INJURY), INITIAL ENCOUNTER: Primary | ICD-10-CM

## 2022-06-17 PROCEDURE — 25000003 PHARM REV CODE 250: Mod: ER | Performed by: EMERGENCY MEDICINE

## 2022-06-17 PROCEDURE — 99284 EMERGENCY DEPT VISIT MOD MDM: CPT | Mod: 25,ER

## 2022-06-17 RX ORDER — BUTALBITAL, ACETAMINOPHEN AND CAFFEINE 50; 325; 40 MG/1; MG/1; MG/1
1 TABLET ORAL EVERY 6 HOURS PRN
Qty: 12 TABLET | Refills: 0 | Status: SHIPPED | OUTPATIENT
Start: 2022-06-17 | End: 2022-07-17

## 2022-06-17 RX ORDER — BUTALBITAL, ACETAMINOPHEN AND CAFFEINE 50; 325; 40 MG/1; MG/1; MG/1
1 TABLET ORAL
Status: COMPLETED | OUTPATIENT
Start: 2022-06-17 | End: 2022-06-17

## 2022-06-17 RX ADMIN — BUTALBITAL, ACETAMINOPHEN AND CAFFEINE 1 TABLET: 50; 325; 40 TABLET ORAL at 01:06

## 2022-06-17 NOTE — ED PROVIDER NOTES
"Encounter Date: 6/17/2022    SCRIBE #1 NOTE: I, Chanell Yousif, am scribing for, and in the presence of,  Breanna Guzman MD. I have scribed the following portions of the note - Other sections scribed: HPI; ROS; PE.       History     Chief Complaint   Patient presents with    Headache     Pt states," I got hit in the head three days ago. I have a headache."     Richa Alberts is a 71 y.o. female with Hx of HTN and HLD who presents to the ED for chief complaint of headache s/p traumatic injury 2 days ago. Patient reports she was walking in a parking lot when the arm of a barrier gate struck her in the top of the head.  She denies LOC.  She was momentarily stunned but was able to continue on with her planned activity.  Since the accident, she reports continued pain in her head.  She tried motrin and tylenol with no relief.  She reports 2 episodes of vomiting yesterday.  Patient reports head pain is worse with lying flat on her back. She denies associated weakness or numbness. She is allergic to benadryl, penicillin, and sulfa.  She is not a smoker.      The history is provided by the patient. No  was used.     Review of patient's allergies indicates:   Allergen Reactions    Benadryl [diphenhydramine hcl]     Penicillins     Sulfa (sulfonamide antibiotics)      Past Medical History:   Diagnosis Date    Hypertension      Past Surgical History:   Procedure Laterality Date    CHOLECYSTECTOMY      TUBAL LIGATION      vaginal excisional biopsy       Family History   Problem Relation Age of Onset    Hypertension Father     Diabetes Mother     Hypertension Mother     Diabetes Sister     Stroke Sister      Social History     Tobacco Use    Smoking status: Never Smoker   Substance Use Topics    Alcohol use: No    Drug use: No     Review of Systems   Constitutional: Negative for activity change, appetite change, chills and fever.   HENT: Negative for congestion, rhinorrhea, sneezing and sore " throat.    Respiratory: Negative for cough, choking, shortness of breath and wheezing.    Cardiovascular: Negative for chest pain and palpitations.   Gastrointestinal: Positive for vomiting. Negative for abdominal pain, diarrhea and nausea.   Neurological: Positive for headaches. Negative for dizziness, syncope, weakness, light-headedness and numbness.   All other systems reviewed and are negative.      Physical Exam     Initial Vitals [06/17/22 1147]   BP Pulse Resp Temp SpO2   130/83 84 16 98 °F (36.7 °C) 97 %      MAP       --         Physical Exam    Nursing note and vitals reviewed.  Constitutional: Vital signs are normal. She appears well-developed and well-nourished. She is cooperative. She does not appear ill. No distress.   HENT:   Head: Normocephalic and atraumatic.   Mouth/Throat: Uvula is midline and mucous membranes are normal.   Tenderness to top of head   Eyes: Conjunctivae and lids are normal.   Neck: Neck supple.   Normal range of motion.  Cardiovascular: Normal rate, regular rhythm, S1 normal, S2 normal and normal heart sounds.   No murmur heard.  Pulmonary/Chest: Effort normal and breath sounds normal. No respiratory distress. She has no wheezes.   Abdominal: Abdomen is soft. Bowel sounds are normal. She exhibits no distension. There is no abdominal tenderness.   Musculoskeletal:         General: Normal range of motion.      Cervical back: Normal range of motion and neck supple.     Neurological: She is alert and oriented to person, place, and time. She has normal strength. She displays no tremor. No cranial nerve deficit or sensory deficit. She exhibits normal muscle tone. Coordination normal. GCS eye subscore is 4. GCS verbal subscore is 5. GCS motor subscore is 6.   Skin: Skin is warm, dry and intact.   Psychiatric: She has a normal mood and affect. Her speech is normal and behavior is normal.         ED Course   Procedures  Labs Reviewed - No data to display       Imaging Results          CT  Head Without Contrast (Final result)  Result time 06/17/22 13:13:47    Final result by Kang Leigh MD (06/17/22 13:13:47)                 Impression:      No evidence of acute intracranial hemorrhage.      Electronically signed by: Kang Leigh MD  Date:    06/17/2022  Time:    13:13             Narrative:    EXAMINATION:  CT HEAD WITHOUT CONTRAST    CLINICAL HISTORY:  blunt head trauma;    TECHNIQUE:  Low dose axial CT images obtained throughout the head without the use of intravenous contrast.  Axial, sagittal and coronal reconstructions were performed.    COMPARISON:  06/18/2020    FINDINGS:  Intracranial compartment:    Ventricles and sulci are normal in size for age without evidence of hydrocephalus.    The brain parenchyma appears stable.  Mild patchy hypoattenuation in the supratentorial white matter, nonspecific but most likely reflecting chronic small vessel ischemic changes.  No new parenchymal mass, hemorrhage, edema or major vascular distribution infarct.  No mass effect or midline shift.    No extra-axial blood or fluid collections.    Skull/extracranial contents (limited evaluation):    No displaced calvarial fracture.    Mastoid air cells are clear. Mild patchy mucosal thickening in the visualized paranasal sinuses.                               CT Cervical Spine Without Contrast (Final result)  Result time 06/17/22 13:23:38    Final result by Kang Leigh MD (06/17/22 13:23:38)                 Impression:      Moderate cervical spondylosis, without evidence of acute fracture or traumatic malalignment.      Electronically signed by: Knag Leigh MD  Date:    06/17/2022  Time:    13:23             Narrative:    EXAMINATION:  CT CERVICAL SPINE WITHOUT CONTRAST    CLINICAL HISTORY:  blunt head injury;    TECHNIQUE:  Low dose axial CT images through the cervical spine, with sagittal and coronal reformations.  Contrast was not administered.    COMPARISON:  08/18/2020    FINDINGS:  The  vertebral bodies are stable in height and morphology.  No evidence of acute displaced fracture or osseous destructive process.    Stable alignment, with slight anterolisthesis at C3-4.    Moderate cervical spondylosis multilevel loss of disc height most pronounced from C4 through C7.  Mild spinal canal stenosis suggested in the mid cervical region.  Multilevel neural foraminal narrowing from uncovertebral facet hypertrophy.    Limited evaluation of the intraspinal contents demonstrates no hematoma or mass.Paraspinal soft tissues exhibit no acute abnormalities.                                 Medications   butalbital-acetaminophen-caffeine -40 mg per tablet 1 tablet (1 tablet Oral Given 6/17/22 1346)     Medical Decision Making:   Clinical Tests:   Radiological Study: Ordered and Reviewed  ED Management:  Persistent HA after blunt head injury.  No LOC, at baseline, neuro intact on exam.  Head and cervical spine CTs are normal.  Will treat HA with frioicet since not relieved with motrin/tylenol.          Scribe Attestation:   Scribe #1: I performed the above scribed service and the documentation accurately describes the services I performed. I attest to the accuracy of the note.               I, Dr. Breanna Guzman, personally performed the services described in this documentation.   All medical record entries made by the scribe were at my direction and in my presence.   I have reviewed the chart and agree that the record is accurate and complete.   Breanna Guzman MD.  12:33 PM 06/18/2022     Clinical Impression:   Final diagnoses:  [S09.90XA] CHI (closed head injury), initial encounter (Primary)          ED Disposition Condition    Discharge Stable        ED Prescriptions     Medication Sig Dispense Start Date End Date Auth. Provider    butalbital-acetaminophen-caffeine -40 mg (FIORICET, ESGIC) -40 mg per tablet Take 1 tablet by mouth every 6 (six) hours as needed for Headaches. 12 tablet 6/17/2022 7/17/2022  Breanna Guzman MD        Follow-up Information     Follow up With Specialties Details Why Contact Info    Kel Zamora MD General Practice  As needed [address] [place] [address] [address]  [place] [id number]  [phone]             Breanna Guzman MD  06/18/22 0720

## 2022-06-17 NOTE — DISCHARGE INSTRUCTIONS
Take the prescribed medicine as needed.  You may also take motrin but do not take additional tylenol.  If you are not improving with this treatment, see your doctor.

## 2022-10-03 ENCOUNTER — OFFICE VISIT (OUTPATIENT)
Dept: URGENT CARE | Facility: CLINIC | Age: 72
End: 2022-10-03
Payer: COMMERCIAL

## 2022-10-03 VITALS
HEART RATE: 76 BPM | WEIGHT: 150 LBS | OXYGEN SATURATION: 96 % | BODY MASS INDEX: 26.57 KG/M2 | RESPIRATION RATE: 17 BRPM | SYSTOLIC BLOOD PRESSURE: 187 MMHG | TEMPERATURE: 98 F | DIASTOLIC BLOOD PRESSURE: 78 MMHG

## 2022-10-03 DIAGNOSIS — B02.9 HERPES ZOSTER WITHOUT COMPLICATION: Primary | ICD-10-CM

## 2022-10-03 PROCEDURE — 1159F MED LIST DOCD IN RCRD: CPT | Mod: CPTII,S$GLB,, | Performed by: NURSE PRACTITIONER

## 2022-10-03 PROCEDURE — 1160F RVW MEDS BY RX/DR IN RCRD: CPT | Mod: CPTII,S$GLB,, | Performed by: NURSE PRACTITIONER

## 2022-10-03 PROCEDURE — 1126F PR PAIN SEVERITY QUANTIFIED, NO PAIN PRESENT: ICD-10-PCS | Mod: CPTII,S$GLB,, | Performed by: NURSE PRACTITIONER

## 2022-10-03 PROCEDURE — 1160F PR REVIEW ALL MEDS BY PRESCRIBER/CLIN PHARMACIST DOCUMENTED: ICD-10-PCS | Mod: CPTII,S$GLB,, | Performed by: NURSE PRACTITIONER

## 2022-10-03 PROCEDURE — 1159F PR MEDICATION LIST DOCUMENTED IN MEDICAL RECORD: ICD-10-PCS | Mod: CPTII,S$GLB,, | Performed by: NURSE PRACTITIONER

## 2022-10-03 PROCEDURE — 3077F PR MOST RECENT SYSTOLIC BLOOD PRESSURE >= 140 MM HG: ICD-10-PCS | Mod: CPTII,S$GLB,, | Performed by: NURSE PRACTITIONER

## 2022-10-03 PROCEDURE — 99203 PR OFFICE/OUTPT VISIT, NEW, LEVL III, 30-44 MIN: ICD-10-PCS | Mod: S$GLB,,, | Performed by: NURSE PRACTITIONER

## 2022-10-03 PROCEDURE — 4010F PR ACE/ARB THEARPY RXD/TAKEN: ICD-10-PCS | Mod: CPTII,S$GLB,, | Performed by: NURSE PRACTITIONER

## 2022-10-03 PROCEDURE — 3078F DIAST BP <80 MM HG: CPT | Mod: CPTII,S$GLB,, | Performed by: NURSE PRACTITIONER

## 2022-10-03 PROCEDURE — 3008F PR BODY MASS INDEX (BMI) DOCUMENTED: ICD-10-PCS | Mod: CPTII,S$GLB,, | Performed by: NURSE PRACTITIONER

## 2022-10-03 PROCEDURE — 3077F SYST BP >= 140 MM HG: CPT | Mod: CPTII,S$GLB,, | Performed by: NURSE PRACTITIONER

## 2022-10-03 PROCEDURE — 99203 OFFICE O/P NEW LOW 30 MIN: CPT | Mod: S$GLB,,, | Performed by: NURSE PRACTITIONER

## 2022-10-03 PROCEDURE — 3008F BODY MASS INDEX DOCD: CPT | Mod: CPTII,S$GLB,, | Performed by: NURSE PRACTITIONER

## 2022-10-03 PROCEDURE — 1126F AMNT PAIN NOTED NONE PRSNT: CPT | Mod: CPTII,S$GLB,, | Performed by: NURSE PRACTITIONER

## 2022-10-03 PROCEDURE — 4010F ACE/ARB THERAPY RXD/TAKEN: CPT | Mod: CPTII,S$GLB,, | Performed by: NURSE PRACTITIONER

## 2022-10-03 PROCEDURE — 3078F PR MOST RECENT DIASTOLIC BLOOD PRESSURE < 80 MM HG: ICD-10-PCS | Mod: CPTII,S$GLB,, | Performed by: NURSE PRACTITIONER

## 2022-10-03 RX ORDER — VALACYCLOVIR HYDROCHLORIDE 1 G/1
1000 TABLET, FILM COATED ORAL 3 TIMES DAILY
Qty: 21 TABLET | Refills: 0 | Status: SHIPPED | OUTPATIENT
Start: 2022-10-03 | End: 2022-10-10

## 2022-10-03 RX ORDER — ROSUVASTATIN CALCIUM 20 MG/1
TABLET, COATED ORAL
COMMUNITY
Start: 2022-01-20

## 2022-10-03 RX ORDER — AMLODIPINE BESYLATE 5 MG/1
TABLET ORAL
COMMUNITY

## 2022-10-03 RX ORDER — TRAMADOL HYDROCHLORIDE 50 MG/1
50 TABLET ORAL EVERY 8 HOURS PRN
Qty: 15 TABLET | Refills: 0 | Status: SHIPPED | OUTPATIENT
Start: 2022-10-03 | End: 2022-10-08

## 2022-10-03 RX ORDER — ERGOCALCIFEROL 1.25 MG/1
CAPSULE ORAL
COMMUNITY

## 2022-10-03 RX ORDER — NAPROXEN 500 MG/1
500 TABLET ORAL 2 TIMES DAILY PRN
Qty: 20 TABLET | Refills: 0 | Status: SHIPPED | OUTPATIENT
Start: 2022-10-03 | End: 2022-10-13

## 2022-10-03 NOTE — PROGRESS NOTES
Subjective:       Patient ID: Richa Alberts is a 72 y.o. female.    Vitals:  weight is 68 kg (150 lb). Her temperature is 98.2 °F (36.8 °C). Her blood pressure is 187/78 (abnormal) and her pulse is 76. Her respiration is 17 and oxygen saturation is 96%.     Chief Complaint: Rash    Pt is a 71 yo female who states she developed a rash under her left breast that started today. The rash it very itchy and painful. She has a history of recurrent shingles. Pt reports she is under tremendous stress right now.     Rash  This is a new problem. The current episode started today. The problem is unchanged. The rash is characterized by redness, dryness and itchiness. She was exposed to nothing. Associated symptoms include a fever. Past treatments include nothing. The treatment provided no relief.     Constitution: Positive for fever.   Skin:  Positive for rash. Negative for erythema.     Objective:      Physical Exam   Constitutional: She is oriented to person, place, and time. She appears well-developed.   HENT:   Head: Normocephalic and atraumatic. Head is without abrasion, without contusion and without laceration.   Ears:   Right Ear: External ear normal.   Left Ear: External ear normal.   Nose: Nose normal.   Mouth/Throat: Oropharynx is clear and moist and mucous membranes are normal.   Eyes: Conjunctivae, EOM and lids are normal. Pupils are equal, round, and reactive to light.   Neck: Trachea normal and phonation normal. Neck supple.   Cardiovascular: Normal rate, regular rhythm and normal heart sounds.   Pulmonary/Chest: Effort normal and breath sounds normal. No stridor. No respiratory distress.   Musculoskeletal: Normal range of motion.         General: Normal range of motion.   Neurological: She is alert and oriented to person, place, and time.   Skin: Skin is warm, dry, intact, rash and vesicular. Capillary refill takes less than 2 seconds. No abrasion, No burn, No bruising, No erythema and No ecchymosis         Psychiatric: Her speech is normal and behavior is normal. Judgment and thought content normal.   Nursing note and vitals reviewed.          Assessment:       1. Herpes zoster without complication          Plan:         Herpes zoster without complication  -     valACYclovir (VALTREX) 1000 MG tablet; Take 1 tablet (1,000 mg total) by mouth 3 (three) times daily. for 7 days  Dispense: 21 tablet; Refill: 0  -     naproxen (NAPROSYN) 500 MG tablet; Take 1 tablet (500 mg total) by mouth 2 (two) times daily as needed (pain).  Dispense: 20 tablet; Refill: 0  -     traMADoL (ULTRAM) 50 mg tablet; Take 1 tablet (50 mg total) by mouth every 8 (eight) hours as needed for Pain.  Dispense: 15 tablet; Refill: 0       Patient Instructions   - You must understand that you have received an Urgent Care treatment only and that you may be released before all of your medical problems are known or treated.   - You, the patient, will arrange for follow up care as instructed.   - If your condition worsens or fails to improve we recommend that you receive another evaluation at the ER immediately or contact your PCP to discuss your concerns.   - You can call [phone] or [phone] to help schedule an appointment with the appropriate provider.

## 2022-10-04 NOTE — PATIENT INSTRUCTIONS
- You must understand that you have received an Urgent Care treatment only and that you may be released before all of your medical problems are known or treated.   - You, the patient, will arrange for follow up care as instructed.   - If your condition worsens or fails to improve we recommend that you receive another evaluation at the ER immediately or contact your PCP to discuss your concerns.   - You can call [phone] or [phone] to help schedule an appointment with the appropriate provider.

## 2022-11-21 ENCOUNTER — OFFICE VISIT (OUTPATIENT)
Dept: URGENT CARE | Facility: CLINIC | Age: 72
End: 2022-11-21
Payer: COMMERCIAL

## 2022-11-21 VITALS
WEIGHT: 150 LBS | SYSTOLIC BLOOD PRESSURE: 157 MMHG | HEART RATE: 86 BPM | DIASTOLIC BLOOD PRESSURE: 85 MMHG | OXYGEN SATURATION: 98 % | TEMPERATURE: 98 F | BODY MASS INDEX: 26.57 KG/M2 | RESPIRATION RATE: 19 BRPM

## 2022-11-21 DIAGNOSIS — R05.9 COUGH, UNSPECIFIED TYPE: Primary | ICD-10-CM

## 2022-11-21 DIAGNOSIS — B34.9 ACUTE VIRAL SYNDROME: ICD-10-CM

## 2022-11-21 LAB
CTP QC/QA: YES
POC MOLECULAR INFLUENZA A AGN: NEGATIVE
POC MOLECULAR INFLUENZA B AGN: NEGATIVE

## 2022-11-21 PROCEDURE — 1160F PR REVIEW ALL MEDS BY PRESCRIBER/CLIN PHARMACIST DOCUMENTED: ICD-10-PCS | Mod: CPTII,S$GLB,,

## 2022-11-21 PROCEDURE — 99213 OFFICE O/P EST LOW 20 MIN: CPT | Mod: S$GLB,,,

## 2022-11-21 PROCEDURE — 3008F BODY MASS INDEX DOCD: CPT | Mod: CPTII,S$GLB,,

## 2022-11-21 PROCEDURE — 4010F PR ACE/ARB THEARPY RXD/TAKEN: ICD-10-PCS | Mod: CPTII,S$GLB,,

## 2022-11-21 PROCEDURE — 87502 POCT INFLUENZA A/B MOLECULAR: ICD-10-PCS | Mod: QW,S$GLB,,

## 2022-11-21 PROCEDURE — 3079F DIAST BP 80-89 MM HG: CPT | Mod: CPTII,S$GLB,,

## 2022-11-21 PROCEDURE — 3079F PR MOST RECENT DIASTOLIC BLOOD PRESSURE 80-89 MM HG: ICD-10-PCS | Mod: CPTII,S$GLB,,

## 2022-11-21 PROCEDURE — 1126F AMNT PAIN NOTED NONE PRSNT: CPT | Mod: CPTII,S$GLB,,

## 2022-11-21 PROCEDURE — 3008F PR BODY MASS INDEX (BMI) DOCUMENTED: ICD-10-PCS | Mod: CPTII,S$GLB,,

## 2022-11-21 PROCEDURE — 1126F PR PAIN SEVERITY QUANTIFIED, NO PAIN PRESENT: ICD-10-PCS | Mod: CPTII,S$GLB,,

## 2022-11-21 PROCEDURE — 3077F PR MOST RECENT SYSTOLIC BLOOD PRESSURE >= 140 MM HG: ICD-10-PCS | Mod: CPTII,S$GLB,,

## 2022-11-21 PROCEDURE — 87502 INFLUENZA DNA AMP PROBE: CPT | Mod: QW,S$GLB,,

## 2022-11-21 PROCEDURE — 1159F MED LIST DOCD IN RCRD: CPT | Mod: CPTII,S$GLB,,

## 2022-11-21 PROCEDURE — 1160F RVW MEDS BY RX/DR IN RCRD: CPT | Mod: CPTII,S$GLB,,

## 2022-11-21 PROCEDURE — 3077F SYST BP >= 140 MM HG: CPT | Mod: CPTII,S$GLB,,

## 2022-11-21 PROCEDURE — 1159F PR MEDICATION LIST DOCUMENTED IN MEDICAL RECORD: ICD-10-PCS | Mod: CPTII,S$GLB,,

## 2022-11-21 PROCEDURE — 99213 PR OFFICE/OUTPT VISIT, EST, LEVL III, 20-29 MIN: ICD-10-PCS | Mod: S$GLB,,,

## 2022-11-21 PROCEDURE — 4010F ACE/ARB THERAPY RXD/TAKEN: CPT | Mod: CPTII,S$GLB,,

## 2022-11-21 RX ORDER — BUTALBITAL, ACETAMINOPHEN AND CAFFEINE 50; 325; 40 MG/1; MG/1; MG/1
CAPSULE ORAL
COMMUNITY

## 2022-11-21 RX ORDER — CLOTRIMAZOLE AND BETAMETHASONE DIPROPIONATE 10; .64 MG/G; MG/G
CREAM TOPICAL 2 TIMES DAILY
COMMUNITY
Start: 2022-11-01

## 2022-11-21 RX ORDER — HYDROXYZINE HYDROCHLORIDE 25 MG/1
25 TABLET, FILM COATED ORAL EVERY 6 HOURS
COMMUNITY
Start: 2022-11-07

## 2022-11-21 RX ORDER — NAPROXEN 500 MG/1
TABLET ORAL
COMMUNITY

## 2022-11-21 RX ORDER — VALACYCLOVIR HYDROCHLORIDE 1 G/1
TABLET, FILM COATED ORAL
COMMUNITY

## 2022-11-21 RX ORDER — DIAZEPAM 10 MG/1
TABLET ORAL
COMMUNITY

## 2022-11-21 RX ORDER — CIPROFLOXACIN HYDROCHLORIDE 3 MG/ML
SOLUTION/ DROPS OPHTHALMIC
COMMUNITY

## 2022-11-21 RX ORDER — NEOMYCIN SULFATE, POLYMYXIN B SULFATE, AND DEXAMETHASONE 3.5; 10000; 1 MG/G; [USP'U]/G; MG/G
OINTMENT OPHTHALMIC
COMMUNITY

## 2022-11-21 NOTE — PATIENT INSTRUCTIONS
You are negative for flu today. Continue OTC cough and cold medicines. Avoid Sudafed as this medication can raise your BP. Try warm liquids with honey for cough. Take tylenol as needed for body aches, chills and fevers. Get plenty of rest. Increase water intake to clear/thin secretions. Return to the clinic for worsening symptoms.

## 2022-11-21 NOTE — PROGRESS NOTES
Subjective:       Patient ID: Richa Alberts is a 72 y.o. female.    Vitals:  weight is 68 kg (150 lb). Her temperature is 98 °F (36.7 °C). Her blood pressure is 157/85 (abnormal) and her pulse is 86. Her respiration is 19 and oxygen saturation is 98%.     Chief Complaint: Cough    Pt to UC c/o cough, runny nose, congestion, sore throat, body aches, chills, HA that started at 2 am today. She had low grade temp at home of 99. She tried OTC cold and cough medicines with relief of symptoms. She denies CP, SOB, abd pain, N/V/D.     Cough  This is a new problem. The current episode started yesterday. The problem has been unchanged. The problem occurs constantly. The cough is Productive of sputum. Associated symptoms include chills, nasal congestion, a sore throat and sweats. Treatments tried: robitussin. The treatment provided no relief.     Constitution: Positive for chills.   HENT:  Positive for sore throat.    Respiratory:  Positive for cough.      Objective:      Physical Exam   Constitutional: She is oriented to person, place, and time. She appears well-developed. She is cooperative.  Non-toxic appearance. She does not appear ill. No distress.   HENT:   Head: Normocephalic and atraumatic.   Ears:   Right Ear: Hearing, tympanic membrane, external ear and ear canal normal.   Left Ear: Hearing, tympanic membrane, external ear and ear canal normal.   Nose: Rhinorrhea and congestion present. No mucosal edema or nasal deformity. No epistaxis. Right sinus exhibits no maxillary sinus tenderness and no frontal sinus tenderness. Left sinus exhibits no maxillary sinus tenderness and no frontal sinus tenderness.   Mouth/Throat: Uvula is midline and mucous membranes are normal. No trismus in the jaw. Normal dentition. No uvula swelling. Posterior oropharyngeal erythema present. No oropharyngeal exudate or posterior oropharyngeal edema.       Eyes: Conjunctivae and lids are normal. No scleral icterus.   Neck: Trachea normal and  phonation normal. Neck supple. No edema present. No erythema present. No neck rigidity present.   Cardiovascular: Normal rate, regular rhythm, normal heart sounds and normal pulses.   Pulmonary/Chest: Effort normal and breath sounds normal. No respiratory distress. She has no decreased breath sounds. She has no rhonchi.   Abdominal: Normal appearance.   Musculoskeletal: Normal range of motion.         General: No deformity. Normal range of motion.   Lymphadenopathy:     She has no cervical adenopathy.   Neurological: She is alert and oriented to person, place, and time. She exhibits normal muscle tone. Coordination normal.   Skin: Skin is warm, dry, intact, not diaphoretic and not pale.   Psychiatric: Her speech is normal and behavior is normal. Judgment and thought content normal.   Nursing note and vitals reviewed.    Results for orders placed or performed in visit on 11/21/22   POCT Influenza A/B MOLECULAR   Result Value Ref Range    POC Molecular Influenza A Ag Negative Negative, Not Reported    POC Molecular Influenza B Ag Negative Negative, Not Reported     Acceptable Yes            Assessment:       1. Cough, unspecified type    2. Acute viral syndrome          Plan:         Cough, unspecified type  -     POCT Influenza A/B MOLECULAR    Acute viral syndrome

## 2024-03-12 ENCOUNTER — HOSPITAL ENCOUNTER (EMERGENCY)
Facility: HOSPITAL | Age: 74
Discharge: HOME | End: 2024-03-12
Attending: EMERGENCY MEDICINE
Payer: COMMERCIAL

## 2024-03-12 VITALS
WEIGHT: 150 LBS | SYSTOLIC BLOOD PRESSURE: 145 MMHG | OXYGEN SATURATION: 100 % | DIASTOLIC BLOOD PRESSURE: 84 MMHG | HEIGHT: 63 IN | BODY MASS INDEX: 26.58 KG/M2 | RESPIRATION RATE: 20 BRPM | TEMPERATURE: 98 F | HEART RATE: 74 BPM

## 2024-03-12 DIAGNOSIS — S61.212A LACERATION OF RIGHT MIDDLE FINGER WITHOUT FOREIGN BODY, NAIL DAMAGE STATUS UNSPECIFIED, INITIAL ENCOUNTER: Primary | ICD-10-CM

## 2024-03-12 DIAGNOSIS — S61.214A LACERATION OF RIGHT RING FINGER WITHOUT FOREIGN BODY, NAIL DAMAGE STATUS UNSPECIFIED, INITIAL ENCOUNTER: ICD-10-CM

## 2024-03-12 PROCEDURE — 12002 RPR S/N/AX/GEN/TRNK2.6-7.5CM: CPT | Mod: ER

## 2024-03-12 PROCEDURE — 99284 EMERGENCY DEPT VISIT MOD MDM: CPT | Mod: 25,ER

## 2024-03-12 PROCEDURE — 25000003 PHARM REV CODE 250: Mod: ER | Performed by: PHYSICIAN ASSISTANT

## 2024-03-12 RX ORDER — IBUPROFEN 400 MG/1
800 TABLET ORAL
Status: COMPLETED | OUTPATIENT
Start: 2024-03-12 | End: 2024-03-12

## 2024-03-12 RX ORDER — LIDOCAINE HYDROCHLORIDE 10 MG/ML
20 INJECTION INFILTRATION; PERINEURAL
Status: COMPLETED | OUTPATIENT
Start: 2024-03-12 | End: 2024-03-12

## 2024-03-12 RX ORDER — HYDROCODONE BITARTRATE AND ACETAMINOPHEN 5; 325 MG/1; MG/1
1 TABLET ORAL EVERY 6 HOURS PRN
Qty: 12 TABLET | Refills: 0 | Status: SHIPPED | OUTPATIENT
Start: 2024-03-12

## 2024-03-12 RX ORDER — ACETAMINOPHEN 500 MG
1000 TABLET ORAL
Status: COMPLETED | OUTPATIENT
Start: 2024-03-12 | End: 2024-03-12

## 2024-03-12 RX ORDER — BACITRACIN ZINC 500 UNIT/G
OINTMENT (GRAM) TOPICAL DAILY
Qty: 14 G | Refills: 0 | Status: SHIPPED | OUTPATIENT
Start: 2024-03-13 | End: 2024-03-20

## 2024-03-12 RX ADMIN — ACETAMINOPHEN 1000 MG: 500 TABLET ORAL at 02:03

## 2024-03-12 RX ADMIN — IBUPROFEN 800 MG: 400 TABLET ORAL at 02:03

## 2024-03-12 RX ADMIN — LIDOCAINE HYDROCHLORIDE 20 ML: 10 INJECTION, SOLUTION INFILTRATION; PERINEURAL at 03:03

## 2024-03-12 NOTE — ED PROVIDER NOTES
Encounter Date: 3/12/2024       History     Chief Complaint   Patient presents with    Laceration     Patient presents w/ a c/o of laceration to the right middle/ring finger today. States that she lost her balance, and caught herself on the box with her right hand causing her laceration. Denies any other injuries. GCS 15.     73-year-old female with past medical history of hypertension and hyperlipidemia presents to the ED today for evaluation lacerations to her 3rd and 4th fingers onset 11:45 a.m. this morning.  Patient reports she was doing laundry when she lost her balance and tripped, she states she was able to catch herself so she did not hit the floor, however while doing this her fingers were cut on the corner of a cabinet.  Reports she called 911 due to bleeding, and the paramedics advised her to come to the ED for stitches.  Patient reports she does not take any blood thinners.  Denies head trauma during her fall. Last tetanus was less than 5 years ago.    The history is provided by the patient. No  was used.     Review of patient's allergies indicates:   Allergen Reactions    Penicillins Shortness Of Breath and Swelling    Alendronate Other (See Comments)     CAUSES PT O BE BENT OVER AND FEELING CRIPPLED    Diphenhydramine hcl Hives    Sulfa (sulfonamide antibiotics) Hives     Past Medical History:   Diagnosis Date    Hypertension      Past Surgical History:   Procedure Laterality Date    CHOLECYSTECTOMY      TUBAL LIGATION      vaginal excisional biopsy       Family History   Problem Relation Age of Onset    Hypertension Father     Diabetes Mother     Hypertension Mother     Diabetes Sister     Stroke Sister      Social History     Tobacco Use    Smoking status: Never     Passive exposure: Never    Smokeless tobacco: Never   Substance Use Topics    Alcohol use: No    Drug use: No     Review of Systems   Constitutional:  Negative for chills, fatigue and fever.   HENT:  Negative for  congestion, sinus pressure, sneezing and sore throat.    Eyes:  Negative for visual disturbance.   Respiratory:  Negative for cough and shortness of breath.    Cardiovascular:  Negative for chest pain.   Gastrointestinal:  Negative for abdominal pain, nausea and vomiting.   Genitourinary:  Negative for difficulty urinating and dysuria.   Musculoskeletal:  Negative for back pain.   Skin:  Positive for wound. Negative for rash.   Neurological:  Negative for syncope and weakness.   Hematological:  Does not bruise/bleed easily.       Physical Exam     Initial Vitals   BP Pulse Resp Temp SpO2   03/12/24 1301 03/12/24 1301 03/12/24 1301 03/12/24 1302 03/12/24 1301   (!) 159/88 80 20 98.2 °F (36.8 °C) 97 %      MAP       --                Physical Exam    Nursing note and vitals reviewed.  Constitutional: She appears well-developed and well-nourished. She is not diaphoretic. No distress.   HENT:   Head: Normocephalic and atraumatic.   Right Ear: External ear normal.   Left Ear: External ear normal.   Eyes: Conjunctivae are normal.   Neck:   Normal range of motion.  Cardiovascular:  Normal rate.           Pulmonary/Chest: No respiratory distress.   Abdominal: She exhibits no distension.   Musculoskeletal:        Hands:       Cervical back: Normal range of motion.      Comments: Lacerations noted just beneath the pad of the 3rd and 4th digit of the right hand at the area marked in the graphic above.  Minimal active bleeding noted at time of examination.  Patient has a normal capillary refill of both of these digits.  Patient is able to flex and extend both of these digits.  Normal radial pulse.     Neurological: She is alert and oriented to person, place, and time. GCS score is 15. GCS eye subscore is 4. GCS verbal subscore is 5. GCS motor subscore is 6.   Skin: Skin is warm and dry.   Psychiatric: She has a normal mood and affect. Her behavior is normal.         ED Course   Lac Repair    Date/Time: 3/12/2024 4:39  PM    Performed by: Julieta Carr PA-C  Authorized by: Karie Watts DO    Consent:     Consent obtained:  Verbal    Consent given by:  Patient  Universal protocol:     Patient identity confirmed:  Verbally with patient  Anesthesia:     Anesthesia method:  Nerve block    Block anesthetic:  Lidocaine 1% w/o epi  Laceration details:     Location:  Finger    Finger location:  R long finger    Length (cm):  1.5  Pre-procedure details:     Preparation:  Patient was prepped and draped in usual sterile fashion and imaging obtained to evaluate for foreign bodies  Exploration:     Hemostasis achieved with:  Direct pressure    Imaging obtained: x-ray      Imaging outcome: foreign body not noted    Treatment:     Area cleansed with:  Saline    Amount of cleaning:  Extensive    Irrigation solution:  Sterile saline    Irrigation method:  Pressure wash  Skin repair:     Repair method:  Sutures    Suture size:  4-0    Suture material:  Nylon    Suture technique:  Simple interrupted    Number of sutures:  4  Approximation:     Approximation:  Loose  Repair type:     Repair type:  Intermediate  Post-procedure details:     Dressing:  Non-adherent dressing    Procedure completion:  Tolerated well, no immediate complications  Lac Repair    Date/Time: 3/12/2024 4:41 PM    Performed by: Julieta Carr PA-C  Authorized by: Karie Watts DO    Consent:     Consent obtained:  Verbal    Consent given by:  Patient  Universal protocol:     Patient identity confirmed:  Verbally with patient  Anesthesia:     Anesthesia method:  Nerve block    Block anesthetic:  Lidocaine 1% w/o epi  Laceration details:     Location:  Finger    Finger location:  R ring finger    Length (cm):  1.5  Pre-procedure details:     Preparation:  Patient was prepped and draped in usual sterile fashion and imaging obtained to evaluate for foreign bodies  Exploration:     Hemostasis achieved with:  Direct pressure    Imaging obtained: x-ray      Imaging outcome: foreign  body not noted    Treatment:     Area cleansed with:  Saline    Amount of cleaning:  Extensive    Irrigation method:  Pressure wash  Skin repair:     Repair method:  Sutures    Suture size:  4-0    Suture material:  Nylon    Suture technique:  Simple interrupted    Number of sutures:  4  Approximation:     Approximation:  Loose  Repair type:     Repair type:  Intermediate  Post-procedure details:     Dressing:  Non-adherent dressing    Labs Reviewed - No data to display       Imaging Results              X-Ray Hand 3 View Right (Final result)  Result time 03/12/24 15:06:25      Final result by Robert Clifford MD (03/12/24 15:06:25)                   Impression:      As above      Electronically signed by: Robert Clifford  Date:    03/12/2024  Time:    15:06               Narrative:    EXAMINATION:  XR HAND COMPLETE 3 VIEW RIGHT    CLINICAL HISTORY:  trauma;    TECHNIQUE:  PA, lateral, and oblique views of the right hand were performed.    COMPARISON:  None    FINDINGS:  Osteopenia.  No acute fractures.  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  Some osteoarthritic spurring also seen at the 1st carpal metacarpal, thumb IP, and 3rd through 5th finger D IP articulations.  Otherwise, preserved joint spaces.  No erosive or inflammatory arthropathic changes.  Question mild soft tissue swelling dorsally at the level of the MCP articulations.                                       Medications   acetaminophen tablet 1,000 mg (1,000 mg Oral Given 3/12/24 1452)   ibuprofen tablet 800 mg (800 mg Oral Given 3/12/24 1452)   LIDOcaine HCL 10 mg/ml (1%) injection 20 mL (20 mLs Infiltration Given 3/12/24 6836)     Medical Decision Making  73-year-old female with past medical history of hypertension and hyperlipidemia presents to the ED today for evaluation lacerations to her 3rd and 4th fingers onset 11:45 a.m. this morning.  Patient reports she was doing  laundry when she lost her balance and tripped, she states she was able to catch herself so she did not hit the floor, however while doing this her fingers were cut on the corner of a cabinet.  Reports she called 911 due to bleeding, and the paramedics advised her to come to the ED for stitches.  Patient reports she does not take any blood thinners.  Denies head trauma during her fall. Last tetanus was less than 5 years ago per chart review. On physical exam there are lacerations noted just beneath the pad of the 3rd and 4th digit of the right hand at the area marked in the graphic above.  Minimal active bleeding noted at time of examination.  Patient has a normal capillary refill of both of these digits.  Patient is able to flex and extend both of these digits.  Normal radial pulse.  We discussed laceration repair options.  Given size a laceration in depth, I would recommend laceration repair with sutures.  Patient voiced understanding and is agreeable.  Tetanus shot is up-to-date.  X-ray shows no acute fractures.  Please read procedure note for details of laceration repair.  In short, a nerve block was used to anesthetize the 3rd and 4th digit of the right hand.  The wounds were thoroughly cleaned and irrigated and repaired with sutures.  Patient counseled to return to ED in 7-10 days for suture removal, or sooner if she experiences any complications.     Of note: Differential diagnosis to include but not limited to: laceration, fracture, soft tissue injury    Julieta Carr PA-C    DISCLAIMER: This note was prepared with [employer] voice recognition transcription software. Garbled syntax, mangled pronouns, and other bizarre constructions may be attributed to that software system. If you have any questions regarding information in this note please contact me.         Risk  OTC drugs.  Prescription drug management.                                      Clinical Impression:  Final diagnoses:  [Q24.248L] Laceration of  right middle finger without foreign body, nail damage status unspecified, initial encounter (Primary)  [S61.214A] Laceration of right ring finger without foreign body, nail damage status unspecified, initial encounter          ED Disposition Condition    Discharge Stable          ED Prescriptions       Medication Sig Dispense Start Date End Date Auth. Provider    HYDROcodone-acetaminophen (NORCO) 5-325 mg per tablet Take 1 tablet by mouth every 6 (six) hours as needed for Pain. 12 tablet 3/12/2024 -- Julieta Carr PA-C    bacitracin 500 unit/gram Oint Apply topically once daily. for 7 days 14 g 3/13/2024 3/20/2024 Julieta Carr PA-C          Follow-up Information       Follow up With Specialties Details Why Contact Info    [facility] ED Emergency Medicine Go in 9 days For suture removal [id number] [facility] [id number] [phone]             Julieta Carr PA-C  03/12/24 2980

## 2024-03-12 NOTE — DISCHARGE INSTRUCTIONS
Please read the patient education material regarding laceration repair with sutures.  Prescription for bacitracin antibiotic ointment was sent to your pharmacy.  You may start using this tomorrow.  Please take the prescribed medications according to the directions on the bottle as needed for pain.  Please note that this medication does contain Tylenol. If your symptoms worsen, if you start to notice pus coming from your wound, fever, chills please return to the ED for reevaluation. Otherwise, please return to the ED in 7-10 days for suture removal.